# Patient Record
Sex: FEMALE | Race: WHITE | NOT HISPANIC OR LATINO | URBAN - METROPOLITAN AREA
[De-identification: names, ages, dates, MRNs, and addresses within clinical notes are randomized per-mention and may not be internally consistent; named-entity substitution may affect disease eponyms.]

---

## 2019-09-30 ENCOUNTER — EMERGENCY (EMERGENCY)
Facility: HOSPITAL | Age: 34
LOS: 1 days | Discharge: HOME | End: 2019-09-30
Admitting: EMERGENCY MEDICINE
Payer: COMMERCIAL

## 2019-09-30 VITALS
SYSTOLIC BLOOD PRESSURE: 112 MMHG | HEART RATE: 76 BPM | OXYGEN SATURATION: 98 % | TEMPERATURE: 99 F | RESPIRATION RATE: 16 BRPM | DIASTOLIC BLOOD PRESSURE: 74 MMHG | WEIGHT: 100.09 LBS | HEIGHT: 62 IN

## 2019-09-30 PROCEDURE — 12032 INTMD RPR S/A/T/EXT 2.6-7.5: CPT

## 2019-09-30 PROCEDURE — 73110 X-RAY EXAM OF WRIST: CPT | Mod: 26,RT

## 2019-09-30 PROCEDURE — 99283 EMERGENCY DEPT VISIT LOW MDM: CPT | Mod: 25

## 2019-09-30 RX ORDER — CEFUROXIME AXETIL 250 MG
250 TABLET ORAL ONCE
Refills: 0 | Status: COMPLETED | OUTPATIENT
Start: 2019-09-30 | End: 2019-09-30

## 2019-09-30 RX ORDER — DROSPIRENONE AND ETHINYL ESTRADIOL 0.03MG-3MG
1 KIT ORAL
Qty: 0 | Refills: 0 | DISCHARGE

## 2019-09-30 RX ORDER — ACETAMINOPHEN 500 MG
650 TABLET ORAL ONCE
Refills: 0 | Status: COMPLETED | OUTPATIENT
Start: 2019-09-30 | End: 2019-09-30

## 2019-09-30 RX ORDER — CEFUROXIME AXETIL 250 MG
1 TABLET ORAL
Qty: 6 | Refills: 0
Start: 2019-09-30 | End: 2019-10-02

## 2019-09-30 RX ADMIN — Medication 650 MILLIGRAM(S): at 11:07

## 2019-09-30 RX ADMIN — Medication 250 MILLIGRAM(S): at 13:06

## 2019-09-30 NOTE — ED PROVIDER NOTE - CLINICAL SUMMARY MEDICAL DECISION MAKING FREE TEXT BOX
33 y/o F w R writs superficial laceration. UTD on tetanus. Will obtain XR r/o foreign object and repair wound. 35 y/o F w R writs superficial laceration. UTD on tetanus. Will obtain XR r/o foreign object and repair wound. Will cover with 3 days of ceftin

## 2019-09-30 NOTE — ED PROVIDER NOTE - OBJECTIVE STATEMENT
33 y/o F on birth control presents to ED for laceration to the R wrist from broken dish. Pt states a dish broke while washing dishes. Pt rinsed wound under faucet after incident. Denies any numbness, tingling, decrease sensation. Last tetanus 2015.

## 2019-09-30 NOTE — ED PROVIDER NOTE - NSFOLLOWUPINSTRUCTIONS_ED_ALL_ED_FT
-PLEASE FOLLOW-UP WITH YOUR PRIMARY CARE DOCTOR IN 1-2 DAYS.  BRING ALL PAPERWORK FROM TODAY'S VISIT TO YOUR FOLLOW-UP VISIT.  IF YOU DO NOT HAVE A PRIMARY CARE DOCTOR PLEASE REFER TO THE OFFICE/CLINIC INFORMATION GIVEN ABOVE.  YOU MAY ALSO CALL [phone] AND ASK FOR MSLety JEAN MEJÍA.  SHE CAN HELP YOU MAKE A FOLLOW-UP APPOINTMENT.  HER HOURS ARE 11AM-7PM MONDAY - FRIDAY.  -TAKE OVER THE COUNTER TYLENOL 650MG BY MOUTH EVERY 4-6 HOURS AS NEEDED FOR PAIN.  DO NOT MIX WITH ALCOHOL OR OTHER PRESCRIPTION MEDICATIONS THAT ALREADY CONTAIN TYLENOL OR ACETAMINOPHEN.   -TAKE OVER THE COUNTER IBUPROFEN 400-600MG BY MOUTH EVERY 8 HOURS AS NEEDED FOR PAIN.  BE SURE TO TAKE WITH FOOD OR MILK AS THIS MEDICATION CAN CAUSE STOMACH IRRITATION.  -PLEASE RETURN TO THE ER IMMEDIATELY OR CALL 911 FOR ANY HIGH FEVER, TROUBLE BREATHING, VOMITING, SEVERE PAIN, OR ANY OTHER CONCERNS.     Laceration    A laceration is a cut that goes through all of the layers of the skin and into the tissue that is right under the skin. Some lacerations heal on their own. Others need to be closed with skin adhesive strips, skin glue, stitches (sutures), or staples. Proper laceration care minimizes the risk of infection and helps the laceration to heal better.  If non-absorbable stitches or staples have been placed, they must be taken out within the time frame instructed by your healthcare provider.    SEEK IMMEDIATE MEDICAL CARE IF YOU HAVE ANY OF THE FOLLOWING SYMPTOMS: swelling around the wound, worsening pain, drainage from the wound, red streaking going away from your wound, inability to move finger or toe near the laceration, or discoloration of skin near the laceration.

## 2019-09-30 NOTE — ED PROVIDER NOTE - PROGRESS NOTE DETAILS
sutures placed- 2 pieces of glass fragments removed- given ceftin here and Rx sent. Will Dc at this time. neuro vascularly intact. Givens strict return precautions.

## 2019-09-30 NOTE — ED PROVIDER NOTE - PATIENT PORTAL LINK FT
You can access the FollowMyHealth Patient Portal offered by [facility] by registering at the following website: [email or web address]. By joining [facility]’s FollowMyHealth portal, you will also be able to view your health information using other applications (apps) compatible with our system.

## 2019-09-30 NOTE — ED PROVIDER NOTE - DIAGNOSTIC INTERPRETATION
Interpreted by HEDY IBANEZ wrist x-ray, 3 views  INTERPRETATION:  no acute fracture; no soft tissue swelling noted; no discoloration. 2 very small superficial fragments seen.

## 2019-09-30 NOTE — ED PROVIDER NOTE - PHYSICAL EXAMINATION
VITAL SIGNS: I have reviewed nursing notes and confirm.  CONSTITUTIONAL: Well-developed; well-nourished; in no acute distress.  SKIN: R wrist w 4cm superficial laceration, oozing blood. Clean, no foreign body. +2 distal pulses. Fingers are pink and warm, >2 sec cap refill. 5/5 hand . Sensation intact.   HEAD: Normocephalic; atraumatic.  EYES: PERRL, EOM intact; conjunctiva and sclera clear.  ENT: No nasal discharge; airway clear.  NECK: Supple; non tender.  CARD: S1, S2 normal; no murmurs, gallops, or rubs. Regular rate and rhythm.  RESP: No wheezes, rales or rhonchi.  ABD: Normal bowel sounds; soft; non-distended; non-tender; no hepatosplenomegaly.  EXT: Normal ROM. No clubbing, cyanosis or edema.  NEURO: Alert, oriented. Grossly unremarkable.  PSYCH: Cooperative, appropriate.

## 2019-10-04 DIAGNOSIS — S61.521A LACERATION WITH FOREIGN BODY OF RIGHT WRIST, INITIAL ENCOUNTER: ICD-10-CM
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
## 2019-10-04 DIAGNOSIS — S61.511A LACERATION WITHOUT FOREIGN BODY OF RIGHT WRIST, INITIAL ENCOUNTER: ICD-10-CM

## 2019-10-17 ENCOUNTER — EMERGENCY (EMERGENCY)
Facility: HOSPITAL | Age: 34
LOS: 1 days | Discharge: HOME | End: 2019-10-17
Attending: EMERGENCY MEDICINE | Admitting: EMERGENCY MEDICINE

## 2019-10-17 VITALS
TEMPERATURE: 98 F | RESPIRATION RATE: 16 BRPM | SYSTOLIC BLOOD PRESSURE: 108 MMHG | WEIGHT: 100.09 LBS | OXYGEN SATURATION: 97 % | DIASTOLIC BLOOD PRESSURE: 73 MMHG | HEART RATE: 64 BPM

## 2019-10-17 NOTE — ED PROVIDER NOTE - PHYSICAL EXAMINATION
Physical Exam  GEN: Awake, alert, non-toxic appearing, NCAT  EYES: full EOMI,  ENT: External inspection normal, normal voice,   HEAD: atraumatic  NECK: FROM neck, supple, no meningismus, trachea midline, no JVD  MSK: soft compartment  SKIN: wound appears closed, no dehiscence, no bleeding or erythema or drainage

## 2019-10-17 NOTE — ED ADULT NURSE NOTE - OBJECTIVE STATEMENT
35 yo F c.o need for suture removal from right wrist. Pt state "I had cut my wrist on a broken glass 16 days ago and come in and had sutures placed. 8 days ago I went to urgent care and they told me it was still open and put me on another abx. I finished the abx yesterday and went to my PCP and she told me that it doesn't look well and sent me here". Pt has noted 7 sutures placed in right wrist. Minimal redness to area noted. No exudate noted. Pt c.o 3/10 pain in wrist with movement and states "it feels tight when I move my wrist". Pt denies f/chills/n/v/d/cp, sob or n/t to bl upper and lower extremities at this time.

## 2019-10-17 NOTE — ED PROVIDER NOTE - OBJECTIVE STATEMENT
34 yof pw suture removal, sp 16 days.  pt went to UC 8 days ago, was told wound not fully closed, placed on additional course of abx.  has not applied bacitracin on wound after cleaning.  no fc.  no bleeding or drainage. R palm

## 2019-10-17 NOTE — ED ADULT NURSE NOTE - CHPI ED NUR SYMPTOMS NEG
no bleeding at site/no fever/no purulent drainage/no inflammation/no rectal pain/no bleeding/no drainage/no chills

## 2019-10-17 NOTE — ED PROVIDER NOTE - PATIENT PORTAL LINK FT
You can access the FollowMyHealth Patient Portal offered by [facility] by registering at the following website: [email or web address]. By joining [facility]’s FollowMyHealth portal, you will also be able to view your health information using other applications (apps) compatible with our system.

## 2019-10-17 NOTE — ED PROVIDER NOTE - NSFOLLOWUPINSTRUCTIONS_ED_ALL_ED_FT
Follow up with your primary care doctor  Gently clean the wound with soap and water twice a day  Apply bacitracin/neosporin after cleaning  You can also get over the counter aquaphor or mederma to minimize scarring  Return immediately for any new or worsening symptoms or any new concerns

## 2019-10-22 DIAGNOSIS — Z48.02 ENCOUNTER FOR REMOVAL OF SUTURES: ICD-10-CM

## 2021-07-20 NOTE — ED PROVIDER NOTE - SKIN NEGATIVE STATEMENT, MLM
see hpi Dapsone Pregnancy And Lactation Text: This medication is Pregnancy Category C and is not considered safe during pregnancy or breast feeding.

## 2023-01-31 NOTE — ED PROVIDER NOTE - PROGRESS NOTE
Location #2: hands and feet Detail Level: Zone Consent: Written consent obtained, risks reviewed including but not limited to crusting, scabbing, blistering, scarring, darker or lighter pigmentary change, incidental hair removal, bruising, and/or incomplete removal. Location #4: hands / feet Dose Settinge #4 (Mj/Cm2): 3120 Total Energy In Joules: 1507.28 Dose Setting #2 (Mj/Cm2): 7503 Comments: Numbers fixed from last appointment Location #3: lips Post-Care Instructions: I reviewed with the patient in detail post-care instructions. Patient should stay away from the sun and wear sun protection until treated areas are fully healed. Location #1: Abd, chest, arms, upper back Treatment Number: 405 Patient Id: CS - 0002 % Increase/Decrease From Last Treatment: +5,+5,+5 Total Square Area In Cm2 (Required For Proper Billing): 979 Dose Setting #3 (Mj/Cm2): 891 Dose Setting #5 (Mj/Cm2): 870 Dose Setting #1 (Mj/Cm2): 9378 Dose Increase/Decrease #3: +5 Dose Increase/Decrease #5: 0 Improved.

## 2023-03-21 NOTE — ED ADULT TRIAGE NOTE - NS ED NURSE BANDS TYPE
3/21/2023 2:39 PM    Ms. Case Armas [phone]      School/PE Note         To Whom It May Concern:      Tahmina Gage is currently under the care of [facility]. She will avoid painful activities with the left leg for 3 weeks. If there are questions or concerns please have the patient contact our office.           Sincerely,      Sima Norton NP Name band;

## 2024-01-08 NOTE — ED ADULT NURSE NOTE - NS ED NURSE DISCH DISPOSITION
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 These results would explain the diffuse bilateral pain and tingling. Mukul has been apprehensive about injection based therapy so if he is interested in exploring options we can schedule a office visit to discuss PNS to left ulnar nerve if interested.   
Left message for patient to call clinic in regards to the below information. Clinic number given to call back.   
Discharged